# Patient Record
Sex: MALE | Race: WHITE | URBAN - METROPOLITAN AREA
[De-identification: names, ages, dates, MRNs, and addresses within clinical notes are randomized per-mention and may not be internally consistent; named-entity substitution may affect disease eponyms.]

---

## 2017-02-09 ENCOUNTER — OFFICE VISIT (OUTPATIENT)
Dept: OPHTHALMOLOGY | Facility: CLINIC | Age: 22
End: 2017-02-09
Attending: OPHTHALMOLOGY
Payer: COMMERCIAL

## 2017-02-09 DIAGNOSIS — H57.02 ANISOCORIA: ICD-10-CM

## 2017-02-09 DIAGNOSIS — H50.22 HYPERTROPIA OF LEFT EYE: Primary | ICD-10-CM

## 2017-02-09 PROCEDURE — 99212 OFFICE O/P EST SF 10 MIN: CPT | Mod: ZF | Performed by: TECHNICIAN/TECHNOLOGIST

## 2017-02-09 PROCEDURE — 92060 SENSORIMOTOR EXAMINATION: CPT | Mod: ZF | Performed by: OPHTHALMOLOGY

## 2017-02-09 ASSESSMENT — SLIT LAMP EXAM - LIDS
COMMENTS: NORMAL
COMMENTS: NORMAL

## 2017-02-09 ASSESSMENT — VISUAL ACUITY
OS_SC+: -2
OD_SC: 20/15
CORRECTION_TYPE: GLASSES
METHOD: SNELLEN - LINEAR
OD_SC+: -
OS_SC: 20/15

## 2017-02-09 ASSESSMENT — TONOMETRY
OS_IOP_MMHG: 16
OD_IOP_MMHG: 13

## 2017-02-09 ASSESSMENT — EXTERNAL EXAM - LEFT EYE: OS_EXAM: NORMAL

## 2017-02-09 ASSESSMENT — EXTERNAL EXAM - RIGHT EYE: OD_EXAM: NORMAL

## 2017-02-09 NOTE — PROGRESS NOTES
Assessment & Plan     Abiodun Medina is a 21 year old male with the following diagnoses:   1. Hypertropia of left eye    2. Anisocoria       He has aniscoria with the LEFT pupil smaller than the RIGHT.  He has a smaller palpebral fissure on the left.  Iopdine was placed in both eyes and there was no change to the pupil or the eyelid. He does not have a Rosalina syndrome.      He has a left hypertropia in upgaze after orbital fracture.  Recommend observation since not interfering with daily living.           Attending Physician Attestation:  I have seen and examined this patient.  I have confirmed and edited as necessary the chief complaint(s), history of present illness, review of systems, relevant history, and examination findings as documented by others.  I have personally reviewed the relevant tests, images, and reports as documented above.  I have confirmed and edited as necessary the assessment and plan and agree with this note.  - Rom Overton MD 3:06 PM 2/9/2017

## 2017-02-09 NOTE — MR AVS SNAPSHOT
After Visit Summary   2017    Abiodun Medina    MRN: [id number]           Patient Information     Date Of Birth          1995        Visit Information        Provider Department      2017 1:15 PM Rom Overton MD [facility] Peds Eye General        Today's Diagnoses     Hypertropia of left eye    -  1     Anisocoria            Follow-ups after your visit        Who to contact     Please call your clinic at [phone] to:    Ask questions about your health    Make or cancel appointments    Discuss your medicines    Learn about your test results    Speak to your doctor   If you have compliments or concerns about an experience at your clinic, or if you wish to file a complaint, please contact [facility] Patient Relations at [phone] or email us at [email or web address]         Additional Information About Your Visit        MyChart Information     Imprivata is an electronic gateway that provides easy, online access to your medical records. With Imprivata, you can request a clinic appointment, read your test results, renew a prescription or communicate with your care team.     To sign up for Zonest visit the website at [email or web address]   You will be asked to enter the access code listed below, as well as some personal information. Please follow the directions to create your username and password.     Your access code is: [email or web address]  Expires: 5/10/2017  3:46 PM     Your access code will  in 90 days. If you need help or a new code, please contact your [facility] or call [phone] for assistance.        Care EveryWhere ID     This is your Care EveryWhere ID. This could be used by other organizations to access your [place] medical records  [id number]         Blood Pressure from Last 3 Encounters:   09 92/58   10/15/09 104/66   09 96/58    Weight from Last 3 Encounters:   09 61.689 kg  (136 lb) (44.30 %*)   10/15/09 61.236 kg (135 lb) (44.97 %*)   04/03/09 58.06 kg (128 lb) (40.85 %*)     * Growth percentiles are based on [facility] 2-20 Years data.              We Performed the Following     Sensorimotor        Primary Care Provider Office Phone # Fax #    Lois Serrano [phone]       [address] [address]        Thank you!     Thank you for choosing [facility]  for your care. Our goal is always to provide you with excellent care. Hearing back from our patients is one way we can continue to improve our services. Please take a few minutes to complete the written survey that you may receive in the mail after your visit with us. Thank you!             Your Updated Medication List - Protect others around you: Learn how to safely use, store and throw away your medicines at www.disposemymeds.org.          This list is accurate as of: 2/9/17  3:46 PM.  Always use your most recent med list.                   Brand Name Dispense Instructions for use    * ADDERALL XR 20 MG per 24 hr capsule   Generic drug:  amphetamine-dextroamphetamine     31    ONE DAILY; fill on or after 12-29-09       * ADDERALL 5 MG per tablet   Generic drug:  amphetamine-dextroamphetamine     30    1 tablet afterschool       * Notice:  This list has 2 medication(s) that are the same as other medications prescribed for you. Read the directions carefully, and ask your doctor or other care provider to review them with you.
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
